# Patient Record
Sex: FEMALE | Race: WHITE
[De-identification: names, ages, dates, MRNs, and addresses within clinical notes are randomized per-mention and may not be internally consistent; named-entity substitution may affect disease eponyms.]

---

## 2017-08-16 ENCOUNTER — HOSPITAL ENCOUNTER (OUTPATIENT)
Dept: HOSPITAL 45 - C.PAPS | Age: 27
Discharge: HOME | End: 2017-08-16
Attending: PHYSICIAN ASSISTANT
Payer: COMMERCIAL

## 2017-08-16 ENCOUNTER — HOSPITAL ENCOUNTER (OUTPATIENT)
Dept: HOSPITAL 45 - C.LABSPEC | Age: 27
Discharge: HOME | End: 2017-08-16
Attending: PHYSICIAN ASSISTANT
Payer: COMMERCIAL

## 2017-08-16 DIAGNOSIS — N89.8: ICD-10-CM

## 2017-08-16 DIAGNOSIS — Z01.419: Primary | ICD-10-CM

## 2017-10-02 ENCOUNTER — HOSPITAL ENCOUNTER (OUTPATIENT)
Dept: HOSPITAL 45 - C.LAB1850 | Age: 27
Discharge: HOME | End: 2017-10-02
Attending: PHYSICIAN ASSISTANT
Payer: COMMERCIAL

## 2017-10-02 DIAGNOSIS — N64.52: Primary | ICD-10-CM

## 2017-10-11 ENCOUNTER — HOSPITAL ENCOUNTER (OUTPATIENT)
Dept: HOSPITAL 45 - C.MAMM | Age: 27
Discharge: HOME | End: 2017-10-11
Attending: PHYSICIAN ASSISTANT
Payer: COMMERCIAL

## 2017-10-11 DIAGNOSIS — N64.52: Primary | ICD-10-CM

## 2017-10-11 NOTE — MAMMOGRAPHY REPORT
ULTRASOUND OF LEFT BREAST: 10/11/2017

CLINICAL HISTORY: 27-year-old woman presents with a two-week history of occasional elicited drops of 
left nipple discharge that are clear/greenish in color, usually in the shower while washing.  Also le
ft breast tenderness.  No skin erythema or palpable lump.  No family history of breast cancer.  





COMPARISON: No prior exams were available for comparison.   



FINDINGS:  Real-time high-resolution ultrasound was performed in the anterior periareolar and retroar
eolar left breast.  Incidental note is made of a morphologically normal intramammary lymph node in th
e left 2:00 breast, 2 cm from the nipple, measuring 5 mm, with a thin cortex measuring 0.9 mm.  Incid
ental note is made of a small cyst cluster in the 4:00 periareolar left breast, measuring 5.1 x 3.3 x
 7.2 mm.  There is no focal duct ectasia or evidence of an intraductal mass.  Overall, no suspicious 
solid or cystic mass is identified in the left breast.



IMPRESSION: ACR BI-RADS CATEGORY 2: BENIGN 

There is no sonographic evidence of malignancy or other suspicious abnormality to explain the elicite
d drops of clear/greenish left nipple discharge.  Therefore, intended clinical follow-up is recommend
ed and possible sampling of the fluid for cytologic analysis may be useful.



These results and recommendations were discussed with the patient at the time of the exam.



Kerry Ramirez M.D.  

ay/:10/11/2017 09:49:30  



Imaging Technologist: Megan CASTRO)[place]), [facility]

letter sent: Normal 1/2  

BI-RADS Code: ACR BI-RADS Category 2: Benign

## 2018-03-16 ENCOUNTER — HOSPITAL ENCOUNTER (OUTPATIENT)
Dept: HOSPITAL 45 - C.LABPVFM | Age: 28
Discharge: HOME | End: 2018-03-16
Attending: FAMILY MEDICINE
Payer: COMMERCIAL

## 2018-03-16 DIAGNOSIS — R11.0: Primary | ICD-10-CM

## 2018-03-16 LAB
ALBUMIN SERPL-MCNC: 4.1 GM/DL (ref 3.4–5)
ALP SERPL-CCNC: 59 U/L (ref 45–117)
ALT SERPL-CCNC: 35 U/L (ref 12–78)
AST SERPL-CCNC: 16 U/L (ref 15–37)
BASOPHILS # BLD: 0.01 K/UL (ref 0–0.2)
BASOPHILS NFR BLD: 0.2 %
BUN SERPL-MCNC: 13 MG/DL (ref 7–18)
CALCIUM SERPL-MCNC: 9.9 MG/DL (ref 8.5–10.1)
CO2 SERPL-SCNC: 27 MMOL/L (ref 21–32)
CREAT SERPL-MCNC: 0.83 MG/DL (ref 0.6–1.2)
EOS ABS #: 0.07 K/UL (ref 0–0.5)
EOSINOPHIL NFR BLD AUTO: 267 K/UL (ref 130–400)
GLUCOSE SERPL-MCNC: 95 MG/DL (ref 70–99)
HCT VFR BLD CALC: 38.3 % (ref 37–47)
HGB BLD-MCNC: 13 G/DL (ref 12–16)
IG#: 0 K/UL (ref 0–0.02)
IMM GRANULOCYTES NFR BLD AUTO: 46.5 %
LIPASE: 82 U/L (ref 73–393)
LYMPHOCYTES # BLD: 2.47 K/UL (ref 1.2–3.4)
MCH RBC QN AUTO: 30.3 PG (ref 25–34)
MCHC RBC AUTO-ENTMCNC: 33.9 G/DL (ref 32–36)
MCV RBC AUTO: 89.3 FL (ref 80–100)
MONO ABS #: 0.3 K/UL (ref 0.11–0.59)
MONOCYTES NFR BLD: 5.6 %
NEUT ABS #: 2.46 K/UL (ref 1.4–6.5)
NEUTROPHILS # BLD AUTO: 1.3 %
NEUTROPHILS NFR BLD AUTO: 46.4 %
PMV BLD AUTO: 9.4 FL (ref 7.4–10.4)
POTASSIUM SERPL-SCNC: 3.9 MMOL/L (ref 3.5–5.1)
PROT SERPL-MCNC: 7.8 GM/DL (ref 6.4–8.2)
RED CELL DISTRIBUTION WIDTH CV: 13.5 % (ref 11.5–14.5)
RED CELL DISTRIBUTION WIDTH SD: 44.2 FL (ref 36.4–46.3)
SODIUM SERPL-SCNC: 138 MMOL/L (ref 136–145)
WBC # BLD AUTO: 5.31 K/UL (ref 4.8–10.8)

## 2018-03-20 ENCOUNTER — HOSPITAL ENCOUNTER (OUTPATIENT)
Dept: HOSPITAL 45 - C.ULTR | Age: 28
Discharge: HOME | End: 2018-03-20
Attending: FAMILY MEDICINE
Payer: COMMERCIAL

## 2018-03-20 DIAGNOSIS — R11.0: Primary | ICD-10-CM

## 2018-03-20 NOTE — DIAGNOSTIC IMAGING REPORT
ABDOMEN LIMITED (US)



HISTORY:  27 years-old Female R11.0 NauseaRUQ acute nausea



COMPARISON: None available



TECHNIQUE: Multiple real-time sonographic images of the abdominal right upper

quadrant were obtained assessing grayscale appearance and color flow



FINDINGS: 

The imaged pancreas is unremarkable. The liver is within normal limits without

focal mass or intrahepatic biliary ductal dilation. Gallbladder is unremarkable

without wall thickening, pericholecystic fluid or shadowing cholelithiasis.

Common bile duct is normal, 5 mm.



Imaged right kidney is unremarkable, 11.8 cm in length without hydronephrosis.



IMPRESSION: Unremarkable right upper quadrant ultrasound. 







The above report was generated using voice recognition software. It may contain

grammatical, syntax or spelling errors.







Electronically signed by:  Mello Valdez M.D.

3/20/2018 8:47 AM



Dictated Date/Time:  3/20/2018 8:44 AM